# Patient Record
Sex: FEMALE | Race: WHITE
[De-identification: names, ages, dates, MRNs, and addresses within clinical notes are randomized per-mention and may not be internally consistent; named-entity substitution may affect disease eponyms.]

---

## 2017-10-15 NOTE — EDM.PDOC
ED HPI GENERAL MEDICAL PROBLEM





- General


Chief Complaint: Lower Extremity Injury/Pain


Stated Complaint: HURT ANKLE


Time Seen by Provider: 10/15/17 01:48


Source of Information: Reports: Patient


History Limitations: Reports: No Limitations





- History of Present Illness


INITIAL COMMENTS - FREE TEXT/NARRATIVE: 





tripped twisted ankle.


  ** Right Ankle


Pain Score (Numeric/FACES): 8





- Related Data


 Allergies











Allergy/AdvReac Type Severity Reaction Status Date / Time


 


No Known Allergies Allergy   Verified 10/15/17 01:20











Home Meds: 


 Home Meds





Albuterol [IMW: Albuterol HFA] 2 puff IH ASDIRECTED PRN 10/15/17 [History]











Past Medical History


Musculoskeletal History: Reports: Fracture





- Past Surgical History


HEENT Surgical History: Reports: Adenoidectomy, Oral Surgery, Tonsillectomy


Musculoskeletal Surgical History: Reports: Other (See Below)


Other Musculoskeletal Surgeries/Procedures:: femur repair





Social & Family History





- Tobacco Use


Smoking Status *Q: Never Smoker





- Caffeine Use


Caffeine Use: Reports: None





- Recreational Drug Use


Recreational Drug Use: No





Review of Systems





- Review of Systems


Review Of Systems: ROS reveals no pertinent complaints other than HPI.





ED EXAM, GENERAL





- Physical Exam


Exam: See Below


Exam Limited By: No Limitations


General Appearance: Alert, WD/WN, Mild Distress, Other (ankle pain)


Ears: Hearing Grossly Normal


Throat/Mouth: Normal Voice, No Airway Compromise


Head: Atraumatic


Neck: Non-Tender, Full Range of Motion


Respiratory/Chest: No Respiratory Distress


Cardiovascular: Regular Rate, Rhythm


GI/Abdominal: Soft, Non-Tender


Extremities: Other (right ankle tender swollen, NV wnl, gait limited to pain.)


Neurological: Alert, Oriented, Normal Cognition, Normal Gait, No Motor/Sensory 

Deficits


Psychiatric: Normal Affect, Normal Mood


Skin Exam: Warm, Dry, Normal Color


Lymphatic: No Adenopathy





Course





- Vital Signs


Last Recorded V/S: 


 Last Vital Signs











Temp  36.6 C   10/15/17 01:20


 


Pulse  114 H  10/15/17 01:20


 


Resp  18   10/15/17 01:20


 


BP  95/51 L  10/15/17 01:22


 


Pulse Ox  100   10/15/17 01:20














- Orders/Labs/Meds


Orders: 


 Active Orders 24 hr











 Category Date Time Status


 


 Ankle Min 3V Rt [CR] Urgent Exams  10/15/17 01:29 Taken


 


 Acetaminophen/HYDROcodone [Norco 325-10 MG] Med  10/15/17 02:03 Once





 1 tab PO ONETIME ONE   














- Re-Assessments/Exams


Free Text/Narrative Re-Assessment/Exam: 





10/15/17 02:03


results discussed with pt.





Departure





- Departure


Time of Disposition: 02:04


Disposition: Home, Self-Care 01


Condition: Good


Clinical Impression: 


Fracture of foot


Qualifiers:


 Encounter type: initial encounter Fracture type: closed Laterality: right 

Qualified Code(s): S92.901A - Unspecified fracture of right foot, initial 

encounter for closed fracture








- Discharge Information


Instructions:  Metatarsal Fracture


Forms:  ED Department Discharge


Additional Instructions: 


1) elevate as much as possible next 48 hours


2) wear cast boot and use crutches until re-evaluated by clinic


3) see clinic Monday for ORTHO[EDIC REFERRAL





rx given;


vicodin 5/325mg bid prn x 12





- My Orders


Last 24 Hours: 


My Active Orders





10/15/17 01:29


Ankle Min 3V Rt [CR] Urgent 





10/15/17 02:03


Acetaminophen/HYDROcodone [Norco 325-10 MG]   1 tab PO ONETIME ONE 














- Assessment/Plan


Last 24 Hours: 


My Active Orders





10/15/17 01:29


Ankle Min 3V Rt [CR] Urgent 





10/15/17 02:03


Acetaminophen/HYDROcodone [Norco 325-10 MG]   1 tab PO ONETIME ONE
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
## 2021-09-28 ENCOUNTER — HOSPITAL ENCOUNTER (EMERGENCY)
Dept: HOSPITAL 43 - DL.ED | Age: 26
Discharge: HOME | End: 2021-09-28
Payer: MEDICAID

## 2021-09-28 DIAGNOSIS — E86.0: ICD-10-CM

## 2021-09-28 DIAGNOSIS — J45.21: ICD-10-CM

## 2021-09-28 DIAGNOSIS — R55: Primary | ICD-10-CM

## 2021-09-28 LAB
AMPHET UR QL SCN: NEGATIVE
AMPHET UR QL SCN: NEGATIVE
AMPHETAMINES UR QL SCN>500 NG/ML: NEGATIVE
ANION GAP SERPL CALC-SCNC: 15.1 MEQ/L (ref 7–13)
BARBITURATES UR QL SCN: NEGATIVE
CHLORIDE SERPL-SCNC: 105 MMOL/L (ref 98–107)
MDMA UR QL SCN: NEGATIVE
OXYCODONE UR QL SCN: NEGATIVE
PCP UR QL SCN: NEGATIVE
SODIUM SERPL-SCNC: 138 MMOL/L (ref 136–145)
SP GR UR STRIP: 1.02 (ref 1–1.03)
TRICYCLICS UR QL SCN: NEGATIVE

## 2021-09-28 PROCEDURE — 80305 DRUG TEST PRSMV DIR OPT OBS: CPT

## 2021-09-28 PROCEDURE — 93005 ELECTROCARDIOGRAM TRACING: CPT

## 2021-09-28 PROCEDURE — 36415 COLL VENOUS BLD VENIPUNCTURE: CPT

## 2021-09-28 PROCEDURE — 71045 X-RAY EXAM CHEST 1 VIEW: CPT

## 2021-09-28 PROCEDURE — 80307 DRUG TEST PRSMV CHEM ANLYZR: CPT

## 2021-09-28 PROCEDURE — 83735 ASSAY OF MAGNESIUM: CPT

## 2021-09-28 PROCEDURE — 84484 ASSAY OF TROPONIN QUANT: CPT

## 2021-09-28 PROCEDURE — 85025 COMPLETE CBC W/AUTO DIFF WBC: CPT

## 2021-09-28 PROCEDURE — 99285 EMERGENCY DEPT VISIT HI MDM: CPT

## 2021-09-28 PROCEDURE — 80053 COMPREHEN METABOLIC PANEL: CPT

## 2021-09-28 PROCEDURE — 83605 ASSAY OF LACTIC ACID: CPT

## 2021-09-28 PROCEDURE — 81003 URINALYSIS AUTO W/O SCOPE: CPT

## 2021-09-28 PROCEDURE — 86140 C-REACTIVE PROTEIN: CPT

## 2021-09-28 PROCEDURE — 94640 AIRWAY INHALATION TREATMENT: CPT

## 2021-09-28 NOTE — EDM.PDOC
ED HPI GENERAL MEDICAL PROBLEM





- General


Chief Complaint: Syncope


Stated Complaint: AMBULANCE


Time Seen by Provider: 21 20:06


Source of Information: Reports: Patient, EMS, RN, RN Notes Reviewed


History Limitations: Reports: No Limitations





- History of Present Illness


INITIAL COMMENTS - FREE TEXT/NARRATIVE: 


Bobbi is a 26 y/o female with a history of asthma who presents to the ED via 

[facility] with complaints of syncopal event.  The patient states she was 

diagnosed with pneumonia yesterday following symptoms of cough and weakness that

started five days ago; she was started on cefdinir and azithromycin which she 

took today.  The patient reports the incident occurred approximately one hour 

prior to arrival to this facility.  She notes she was coughing extremely hard 

and she blacked-out in front of her daughter.  The patient denies fever, shaking

chills, nasal congestion, sore throat, or chest pain.  She states she is feeling

well right now and only agreed to go with the ambulance at the instruction of 

her father.  








- Related Data


                                    Allergies











Allergy/AdvReac Type Severity Reaction Status Date / Time


 


No Known Allergies Allergy   Verified 21 19:45











Home Meds: 


                                    Home Meds





. [No Known Home Meds]  10/28/17 [History]











Past Medical History


Respiratory History: Reports: Asthma


Musculoskeletal History: Reports: Fracture





- Past Surgical History


HEENT Surgical History: Reports: Adenoidectomy, Oral Surgery, Tonsillectomy


Musculoskeletal Surgical History: Reports: Other (See Below)


Other Musculoskeletal Surgeries/Procedures:: femur repair





Social & Family History





- Tobacco Use


Tobacco Use Status *Q: Never Tobacco User


Second Hand Smoke Exposure: No





- Caffeine Use


Caffeine Use: Reports: None





- Recreational Drug Use


Recreational Drug Use: No





ED ROS GENERAL





- Review of Systems


Review Of Systems: Comprehensive ROS is negative, except as noted in HPI.





- Physical Exam


Exam: See Below


Exam Limited By: No Limitations


General Appearance: Alert, No Apparent Distress


Eye Exam: Bilateral Eye: EOMI, Normal Inspection, PERRL (3mm)


Ears: Normal External Exam, Normal Canal, Hearing Grossly Normal, Normal TMs


Nose: Normal Inspection, Normal Mucosa, No Blood


Throat/Mouth: Normal Lips, Normal Teeth, Normal Gums, Normal Voice, No Airway 

Compromise.  No: Normal Inspection, Normal Oropharynx (Dry mucous membranes)


Head Exam: Atraumatic, Normocephalic


Neck: Normal Inspection, Supple, Non-Tender, Full Range of Motion.  No: 

Lymphadenopathy (L), Lymphadenopathy (R)


Respiratory/Chest: No Respiratory Distress, No Accessory Muscle Use, Chest Non-

Tender, Wheezing (Inspiratory and expiratory to bilateral upper lobes; 

Expiratory to bilateral lower lobes).  No: Crackles, Rales, Rhonchi, Stridor, 

Retractions


Cardiovascular: Normal Peripheral Pulses, Regular Rate, Rhythm, No Edema, No 

Gallop, No JVD, No Rub, Tachycardia


GI/Abdominal: Normal Bowel Sounds, Soft, Non-Tender, No Distention, No Abnormal 

Bruit, No Mass, Pelvis Stable


 (Female) Exam: Deferred


Rectal (Female) Exam: Deferred


Neuro Exam (Abbreviated): Alert, Oriented, CN II-XII Intact, Normal Cognition, 

Normal Gait, No Motor/Sensory Deficits


Back Exam: Normal Inspection, Full Range of Motion, NT


Extremities: Normal Inspection, Normal Range of Motion, Non-Tender, No Pedal 

Edema, Normal Capillary Refill


Psychiatric: Normal Affect, Normal Mood


Skin Exam: Warm, Dry, Intact, Normal Color, No Rash.  No: Cyanosis, Ecchymosis, 

Erythema, Jaundice, Mottled, Pallor, Petechiae


  ** #1 Interpretation


EKG Date: 21


Time: 19:53


Rhythm: Other (Sinus tachycardia)


Rate (Beats/Min): 106


Axis: Normal


P-Wave: Present


QRS: Normal


ST-T: Normal


QT: Normal


MO/PQ Interval: 0.155


Comparison: NA - No Prior EKG


EKG Interpretation Comments: 


ST; No evidence of acute myocardial ischemia








Course





- Vital Signs


Last Recorded V/S: 


                                Last Vital Signs











Temp  98.9 F   21 19:53


 


Pulse  107 H  21 19:53


 


Resp  20   21 19:53


 


BP  149/108 H  21 19:53


 


Pulse Ox  98   21 19:53














- Orders/Labs/Meds


Labs: 


                                Laboratory Tests











  21 Range/Units





  19:49 19:49 19:49 


 


WBC  7.7    (5.0-10.0)  10^3/uL


 


RBC  5.61 H    (4.2-5.4)  10^6/uL


 


Hgb  16.8 H D    (12.0-16.0)  g/dL


 


Hct  49.5 H    (37.0-47.0)  %


 


MCV  88.2    ()  fL


 


MCH  29.9    (27.0-34.0)  pg


 


MCHC  33.9    (33.0-35.0)  g/dL


 


Plt Count  249    (150-450)  10^3/uL


 


Neut % (Auto)  88.1 H    (42.2-75.2)  %


 


Lymph % (Auto)  9.7 L    (20.5-50.1)  %


 


Mono % (Auto)  2.0    (2-8)  %


 


Eos % (Auto)  0.1 L    (1.0-3.0)  %


 


Baso % (Auto)  0.1    (0.0-1.0)  %


 


Sodium   138   (136-145)  mmol/L


 


Potassium   4.1   (3.5-5.1)  mmol/L


 


Chloride   105   ()  mmol/L


 


Carbon Dioxide   22   (21-32)  mmol/L


 


Anion Gap   15.1 H   (7-13)  mEq/L


 


BUN   10   (7-18)  mg/dL


 


Creatinine   0.74   (0.55-1.02)  mg/dL


 


Est Cr Clr Drug Dosing   95.30   mL/min


 


Estimated GFR (MDRD)   > 60   


 


BUN/Creatinine Ratio   13.5   (No establ ref range)  


 


Glucose   134 H   (70-99)  mg/dL


 


Lactic Acid    1.0  (0.4-2.0)  mmol/L


 


Calcium   8.8   (8.5-10.1)  mg/dL


 


Magnesium   1.9   (1.8-2.4)  mg/dL


 


Total Bilirubin   0.8   (0.2-1.0)  mg/dL


 


AST   20   (15-37)  U/L


 


ALT   57   (14-59)  U/L


 


Alkaline Phosphatase   97   ()  U/L


 


Troponin I High Sens   5   (<=51)  pg/mL


 


C-Reactive Protein   < 0.2   (0.0-0.9)  mg/dL


 


Total Protein   7.8   (6.4-8.2)  g/dL


 


Albumin   4.1   (3.4-5.0)  g/dL


 


Globulin   3.7   


 


Albumin/Globulin Ratio   1.1   


 


Urine Color     (YELLOW)  


 


Urine Appearance     (CLEAR)  


 


Urine pH     (5.0-9.0)  


 


Ur Specific Gravity     (1.005-1.030)  


 


Urine Protein     (NEGATIVE)  


 


Urine Glucose (UA)     (NEGATIVE)  


 


Urine Ketones     (NEGATIVE)  


 


Urine Occult Blood     (NEGATIVE)  


 


Urine Nitrite     (NEGATIVE)  


 


Urine Bilirubin     (NEGATIVE)  


 


Urine Urobilinogen     (0.2-1.0)  mg/dL


 


Ur Leukocyte Esterase     (NEGATIVE)  


 


Urine Opiates Screen     (NEGATIVE)  


 


Ur Oxycodone Screen     (NEGATIVE)  


 


Urine Methadone Screen     (NEGATIVE)  


 


Ur Barbiturates Screen     (NEGATIVE)  


 


U Tricyclic Antidepress     (NEGATIVE)  


 


Ur Phencyclidine Scrn     (NEGATIVE)  


 


Ur Amphetamine Screen     (NEGATIVE)  


 


U Methamphetamines Scrn     (NEGATIVE)  


 


Urine MDMA Screen     (NEGATIVE)  


 


U Benzodiazepines Scrn     (NEGATIVE)  


 


Urine Cocaine Screen     (NEGATIVE)  


 


U Marijuana (THC) Screen     (NEGATIVE)  


 


Ethyl Alcohol   < 3   (0)  mg/dL














  21 Range/Units





  20:28 20:28 


 


WBC    (5.0-10.0)  10^3/uL


 


RBC    (4.2-5.4)  10^6/uL


 


Hgb    (12.0-16.0)  g/dL


 


Hct    (37.0-47.0)  %


 


MCV    ()  fL


 


MCH    (27.0-34.0)  pg


 


MCHC    (33.0-35.0)  g/dL


 


Plt Count    (150-450)  10^3/uL


 


Neut % (Auto)    (42.2-75.2)  %


 


Lymph % (Auto)    (20.5-50.1)  %


 


Mono % (Auto)    (2-8)  %


 


Eos % (Auto)    (1.0-3.0)  %


 


Baso % (Auto)    (0.0-1.0)  %


 


Sodium    (136-145)  mmol/L


 


Potassium    (3.5-5.1)  mmol/L


 


Chloride    ()  mmol/L


 


Carbon Dioxide    (21-32)  mmol/L


 


Anion Gap    (7-13)  mEq/L


 


BUN    (7-18)  mg/dL


 


Creatinine    (0.55-1.02)  mg/dL


 


Est Cr Clr Drug Dosing    mL/min


 


Estimated GFR (MDRD)    


 


BUN/Creatinine Ratio    (No establ ref range)  


 


Glucose    (70-99)  mg/dL


 


Lactic Acid    (0.4-2.0)  mmol/L


 


Calcium    (8.5-10.1)  mg/dL


 


Magnesium    (1.8-2.4)  mg/dL


 


Total Bilirubin    (0.2-1.0)  mg/dL


 


AST    (15-37)  U/L


 


ALT    (14-59)  U/L


 


Alkaline Phosphatase    ()  U/L


 


Troponin I High Sens    (<=51)  pg/mL


 


C-Reactive Protein    (0.0-0.9)  mg/dL


 


Total Protein    (6.4-8.2)  g/dL


 


Albumin    (3.4-5.0)  g/dL


 


Globulin    


 


Albumin/Globulin Ratio    


 


Urine Color   Yellow  (YELLOW)  


 


Urine Appearance   Slightly cloudy  (CLEAR)  


 


Urine pH   7.0  (5.0-9.0)  


 


Ur Specific Gravity   1.020  (1.005-1.030)  


 


Urine Protein   Negative  (NEGATIVE)  


 


Urine Glucose (UA)   Negative  (NEGATIVE)  


 


Urine Ketones   Negative  (NEGATIVE)  


 


Urine Occult Blood   Negative  (NEGATIVE)  


 


Urine Nitrite   Negative  (NEGATIVE)  


 


Urine Bilirubin   Negative  (NEGATIVE)  


 


Urine Urobilinogen   1.0  (0.2-1.0)  mg/dL


 


Ur Leukocyte Esterase   Negative  (NEGATIVE)  


 


Urine Opiates Screen  Negative   (NEGATIVE)  


 


Ur Oxycodone Screen  Negative   (NEGATIVE)  


 


Urine Methadone Screen  Negative   (NEGATIVE)  


 


Ur Barbiturates Screen  Negative   (NEGATIVE)  


 


U Tricyclic Antidepress  Negative   (NEGATIVE)  


 


Ur Phencyclidine Scrn  Negative   (NEGATIVE)  


 


Ur Amphetamine Screen  Negative   (NEGATIVE)  


 


U Methamphetamines Scrn  Negative   (NEGATIVE)  


 


Urine MDMA Screen  Negative   (NEGATIVE)  


 


U Benzodiazepines Scrn  Negative   (NEGATIVE)  


 


Urine Cocaine Screen  Negative   (NEGATIVE)  


 


U Marijuana (THC) Screen  Negative   (NEGATIVE)  


 


Ethyl Alcohol    (0)  mg/dL











Meds: 


Medications














Discontinued Medications














Generic Name Dose Route Start Last Admin





  Trade Name Freq  PRN Reason Stop Dose Admin


 


Albuterol/Ipratropium  3 ml  21 20:57  21 21:08





  Albuterol/Ipratropium 3.0-0.5 Mg/3 Ml Neb Soln  NEB  21 20:58  3 ml





  ONETIME ONE   Administration


 


Benzonatate  100 mg  21 19:58  21 20:16





  Benzonatate 100 Mg Cap  PO  21 19:59  100 mg





  ONETIME ONE   Administration


 


Lactated Ringer's  1,000 mls @ 999 mls/hr  21 20:25  21 20:38





  Ringers, Lactated  IV  21 21:25  999 mls/hr





  .BOLUS ONE   Administration














- Radiology Interpretation


Free Text/Narrative:: 





[facility] [place] [facility]


Final Radiology Report  Call: [phone]


assistance Online chat: [email or web address]


Name: BOBBI EDMONDS Age: 25Years F Date: 2021


MRN: [id number] SSN: -- : [id number]


Study: CR CHEST 1V FRONTAL Requesting Physician: Lyudmila Miller


Accession: [id number] Images: 1


Addl Studies:


Provided Clinical History: syncope


Contrast: Contrast Medium:


Contrast Amount: Contrast Method:


CONFIDENTIALITY STATEMENT


This report is intended only for use by the referring physician, and only in 

accordance with law. If you received this in error, call [phone].


Page 1 of 1


PROCEDURE INFORMATION:


Exam: XR Chest


Exam date and time: 2021 8:32 PM


Age: 25 years old


Clinical indication: Other: Syncope


TECHNIQUE:


Imaging protocol: XR of the chest.


Views: 1 view.


COMPARISON:


CR CHEST 1 VIEW 2010 4:20 PM


FINDINGS:


Lungs: Unremarkable. No consolidation.


Pleural spaces: Unremarkable. No pleural effusion. No pneumothorax.


Heart/Mediastinum: Unremarkable. No cardiomegaly.


Bones/joints: No evidence of acute osseous abnormality.


IMPRESSION:


No radiographically apparent acute abnormality in the chest.


Thank you for allowing us to participate in the care of your patient.


Dictated and Authenticated by: Rafa Landa MD


2021 9:38 PM Central Time ([place] & [place])





- Re-Assessments/Exams


Free Text/Narrative Re-Assessment/Exam: 





21


CXR obtained while labs pending. VSS.  LR 1L bolus initiated. DuoNeb 

administered.





Patient verbalized improvement in work of breathing following medications 

administration.  Findings of examination, imaging, and lab work reviewed with 

patient.  Will treat  with acute asthma exacerbation with pulmonary prednisone 

burst and albuterol nebulizers.  Supportive cares, as well as red flag signs and

 symptoms which would warrant reevaluation, discussed. Patient verbalized 

understanding and agreement with the plan of care.








Departure





- Departure


Time of Disposition: 21:50


Disposition: Home, Self-Care 01


Condition: Good


Clinical Impression: 


 Dehydration, History of asthma





Syncopal episodes


Qualifiers:


 Syncope type: vasovagal syncope Qualified Code(s): R55 - Syncope and collapse





Acute asthma exacerbation


Qualifiers:


 Asthma severity: mild Asthma persistence: intermittent Qualified Code(s): 

J45.21 - Mild intermittent asthma with (acute) exacerbation








- Discharge Information


*PRESCRIPTION DRUG MONITORING PROGRAM REVIEWED*: Not Applicable


*COPY OF PRESCRIPTION DRUG MONITORING REPORT IN PATIENT HARIKA: Not Applicable


Instructions:  Asthma, Adult, Syncope, Dehydration, Adult


Referrals: 


PCP,None [Primary Care Provider] - 


Forms:  ED Department Discharge


Additional Instructions: 


Rx: Tessalon Perles


Rx: albuterol nebulizer 0.083%


Rx: albuterol nebulizer machine and supplies


Rx: prednisone 





1.) You may discontinue antibiotics for pneumonia as there was no evidence of 

infection within your lungs. 


2.) Use nebulizer q4h for shortness of breath, return to the emergency 

department with no alleviation in symptoms.


3.) Follow up with your primary care provider in 3-5 days regarding today's 

visit. 





Sepsis Event Note (ED)





- Focused Exam


Vital Signs: 


                                   Vital Signs











  Temp Pulse Resp BP Pulse Ox


 


 21 19:53  98.9 F  107 H  20  149/108 H  98

## 2021-09-28 NOTE — CR
PROCEDURE INFORMATION: 

Exam: XR Chest 

Exam date and time: 9/28/2021 8:32 PM 

Age: 25 years old 

Clinical indication: Other: Syncope 



TECHNIQUE: 

Imaging protocol: XR of the chest. 

Views: 1 view. 



COMPARISON: 

CR CHEST 1 VIEW 5/21/2010 4:20 PM 



FINDINGS: 

Lungs: Unremarkable. No consolidation. 

Pleural spaces: Unremarkable. No pleural effusion. No pneumothorax. 

Heart/Mediastinum: Unremarkable. No cardiomegaly. 

Bones/joints: No evidence of acute osseous abnormality. 



IMPRESSION: 

No radiographically apparent acute abnormality in the chest.